# Patient Record
Sex: FEMALE | Race: WHITE | NOT HISPANIC OR LATINO | URBAN - METROPOLITAN AREA
[De-identification: names, ages, dates, MRNs, and addresses within clinical notes are randomized per-mention and may not be internally consistent; named-entity substitution may affect disease eponyms.]

---

## 2018-08-17 ENCOUNTER — IMPORTED ENCOUNTER (OUTPATIENT)
Dept: URBAN - METROPOLITAN AREA CLINIC 1 | Facility: CLINIC | Age: 48
End: 2018-08-17

## 2018-08-17 PROBLEM — H52.4: Noted: 2018-08-17

## 2018-08-17 PROCEDURE — S0620 ROUTINE OPHTHALMOLOGICAL EXA: HCPCS

## 2018-08-17 NOTE — PATIENT DISCUSSION
1.  Presbyopia: Rx was given for corrective spectacles if indicated. Return for an appointment in 1 year 36 with Dr. Trina Carter.

## 2019-10-18 ENCOUNTER — IMPORTED ENCOUNTER (OUTPATIENT)
Dept: URBAN - METROPOLITAN AREA CLINIC 1 | Facility: CLINIC | Age: 49
End: 2019-10-18

## 2019-10-18 PROBLEM — H52.4: Noted: 2019-10-18

## 2019-10-18 PROCEDURE — S0621 ROUTINE OPHTHALMOLOGICAL EXA: HCPCS

## 2019-10-18 NOTE — PATIENT DISCUSSION
1.  Presbyopia -- Rx was given for corrective spectacles if indicated. Return for an appointment in 1 year 36 with Dr. Stacey Borja.

## 2020-12-21 ENCOUNTER — IMPORTED ENCOUNTER (OUTPATIENT)
Dept: URBAN - METROPOLITAN AREA CLINIC 1 | Facility: CLINIC | Age: 50
End: 2020-12-21

## 2020-12-21 PROBLEM — Z01.00: Noted: 2020-12-21

## 2020-12-21 PROBLEM — H52.4: Noted: 2020-12-21

## 2020-12-21 PROCEDURE — S0621 ROUTINE OPHTHALMOLOGICAL EXA: HCPCS

## 2020-12-21 NOTE — PATIENT DISCUSSION
1.  Routine Exam -- Patient has minimal refractive error. Recommended OTC Readers. 2.  Presbyopia 3. Narrow Angles OU -- Low risk for occlusion. 4.  Nuclear Cataracts OU -- Observe. Return for an appointment in 1 year 36 with Dr. Cheryl Casarez.

## 2022-04-02 ASSESSMENT — TONOMETRY
OD_IOP_MMHG: 14
OD_IOP_MMHG: 13
OS_IOP_MMHG: 13
OS_IOP_MMHG: 13
OD_IOP_MMHG: 13
OS_IOP_MMHG: 13

## 2022-04-02 ASSESSMENT — VISUAL ACUITY
OS_CC: 20/20
OS_SC: J2
OD_SC: J2
OS_SC: J2
OD_CC: 20/20
OD_SC: J2
OD_CC: 20/20
OS_SC: J2
OS_CC: 20/20
OD_CC: 20/20
OS_CC: 20/20
OD_SC: J2

## 2024-02-19 ENCOUNTER — COMPREHENSIVE EXAM (OUTPATIENT)
Dept: URBAN - METROPOLITAN AREA CLINIC 1 | Facility: CLINIC | Age: 54
End: 2024-02-19

## 2024-02-19 DIAGNOSIS — H52.12: ICD-10-CM

## 2024-02-19 DIAGNOSIS — Z01.00: ICD-10-CM

## 2024-02-19 DIAGNOSIS — H52.4: ICD-10-CM

## 2024-02-19 DIAGNOSIS — H52.223: ICD-10-CM

## 2024-02-19 PROCEDURE — 92014 COMPRE OPH EXAM EST PT 1/>: CPT

## 2024-02-19 PROCEDURE — 92015 DETERMINE REFRACTIVE STATE: CPT

## 2024-02-19 ASSESSMENT — VISUAL ACUITY
OD_SC: J3
OS_SC: 20/20
OS_SC: J3
OD_SC: 20/20

## 2024-02-19 ASSESSMENT — TONOMETRY
OD_IOP_MMHG: 14
OS_IOP_MMHG: 14

## 2025-02-17 ENCOUNTER — COMPREHENSIVE EXAM (OUTPATIENT)
Age: 55
End: 2025-02-17

## 2025-02-17 DIAGNOSIS — H52.4: ICD-10-CM

## 2025-02-17 DIAGNOSIS — H52.12: ICD-10-CM

## 2025-02-17 DIAGNOSIS — Z01.00: ICD-10-CM

## 2025-02-17 DIAGNOSIS — H52.222: ICD-10-CM

## 2025-02-17 PROCEDURE — 92014 COMPRE OPH EXAM EST PT 1/>: CPT

## 2025-02-17 PROCEDURE — 92015 DETERMINE REFRACTIVE STATE: CPT
